# Patient Record
Sex: FEMALE
[De-identification: names, ages, dates, MRNs, and addresses within clinical notes are randomized per-mention and may not be internally consistent; named-entity substitution may affect disease eponyms.]

---

## 2021-08-07 ENCOUNTER — NURSE TRIAGE (OUTPATIENT)
Dept: OTHER | Facility: CLINIC | Age: 51
End: 2021-08-07

## 2021-08-07 NOTE — TELEPHONE ENCOUNTER
Brief description of triage: shoulder pain    Triage indicates for patient to go to Emergency Department. Pt agreeable with plan. Care advice provided, patient verbalizes understanding; denies any other questions or concerns; instructed to call back for any new or worsening symptoms. This triage is a result of a call to [address] [place] [address]. Please do not respond to the triage nurse through this encounter. Any subsequent communication should be directly with the patient. Reason for Disposition   [1] SEVERE pain AND [2] not improved 2 hours after pain medicine    Answer Assessment - Initial Assessment Questions  1. ONSET: \"When did the pain start? \"      3 weeks. Worse Monday when lifting books. 2. LOCATION: \"Where is the pain located? \"      Front of shoulder that radiates to arm. States has intermittent numbness to fingers. States dull with sharp pain when raising arm. 3. PAIN: \"How bad is the pain? \" (Scale 1-10; or mild, moderate, severe)    - MILD (1-3): doesn't interfere with normal activities    - MODERATE (4-7): interferes with normal activities (e.g., work or school) or awakens from sleep    - SEVERE (8-10): excruciating pain, unable to do any normal activities, unable to hold a cup of water      Beyond a 10. Pain even when not moving and worse with movement. States taking Ibuprofen    4. WORK OR EXERCISE: \"Has there been any recent work or exercise that involved this part of the body? \"      When raising shoulder pain increases. 5. CAUSE: \"What do you think is causing the arm pain? \"      Unknown. No injury. 6. OTHER SYMPTOMS: \"Do you have any other symptoms? \" (e.g., neck pain, swelling, rash, fever, numbness, weakness)      No swelling. Radiating into whole arm. Not into chest or back. 7. PREGNANCY: \"Is there any chance you are pregnant? \" \"When was your last menstrual period? \"      LMP 3 weeks ago    Protocols used: ARM PAIN-ADULT-

## 2022-07-07 ENCOUNTER — NURSE TRIAGE (OUTPATIENT)
Dept: OTHER | Facility: CLINIC | Age: 52
End: 2022-07-07

## 2022-07-07 NOTE — TELEPHONE ENCOUNTER
Subjective: Caller states \"eye swelling\"     Current Symptoms: bilateral eye swelling    Onset: a few hours ago; sudden    Associated Symptoms: NA    Pain Severity: 0/10; N/A; none    Temperature: denies    What has been tried:  Warm compress    LMP: NA Pregnant: Unknown    Recommended disposition: See PCP within 24 Hours    Care advice provided, patient verbalizes understanding; denies any other questions or concerns; instructed to call back for any new or worsening symptoms. given [facility] info per patient request for a virtual visit    This triage is a result of a call to [address] [place] [address]. Please do not respond to the triage nurse through this encounter. Any subsequent communication should be directly with the patient.       Reason for Disposition   MODERATE-SEVERE eyelid swelling on one side  (Exception: due to a mosquito bite)    Protocols used: EYE - [facility]